# Patient Record
Sex: MALE | Race: WHITE | NOT HISPANIC OR LATINO | Employment: OTHER | URBAN - METROPOLITAN AREA
[De-identification: names, ages, dates, MRNs, and addresses within clinical notes are randomized per-mention and may not be internally consistent; named-entity substitution may affect disease eponyms.]

---

## 2018-12-19 ENCOUNTER — OFFICE VISIT (OUTPATIENT)
Dept: FAMILY MEDICINE | Facility: CLINIC | Age: 58
End: 2018-12-19
Payer: COMMERCIAL

## 2018-12-19 VITALS
SYSTOLIC BLOOD PRESSURE: 138 MMHG | HEIGHT: 72 IN | HEART RATE: 66 BPM | DIASTOLIC BLOOD PRESSURE: 70 MMHG | BODY MASS INDEX: 22.89 KG/M2 | TEMPERATURE: 96.9 F | WEIGHT: 169 LBS

## 2018-12-19 DIAGNOSIS — Z11.59 NEED FOR HEPATITIS C SCREENING TEST: ICD-10-CM

## 2018-12-19 DIAGNOSIS — K40.91 UNILATERAL RECURRENT INGUINAL HERNIA WITHOUT OBSTRUCTION OR GANGRENE: ICD-10-CM

## 2018-12-19 DIAGNOSIS — L82.1 SEBORRHEIC KERATOSIS: ICD-10-CM

## 2018-12-19 DIAGNOSIS — Z12.5 SCREENING FOR PROSTATE CANCER: ICD-10-CM

## 2018-12-19 DIAGNOSIS — Z11.4 SCREENING FOR HIV (HUMAN IMMUNODEFICIENCY VIRUS): ICD-10-CM

## 2018-12-19 DIAGNOSIS — Z13.6 SCREENING FOR HEART DISEASE: ICD-10-CM

## 2018-12-19 DIAGNOSIS — A63.0 ANAL CONDYLOMA: Primary | ICD-10-CM

## 2018-12-19 DIAGNOSIS — Z12.11 SCREEN FOR COLON CANCER: ICD-10-CM

## 2018-12-19 DIAGNOSIS — Z23 NEED FOR PROPHYLACTIC VACCINATION AND INOCULATION AGAINST INFLUENZA: ICD-10-CM

## 2018-12-19 PROCEDURE — 36415 COLL VENOUS BLD VENIPUNCTURE: CPT | Performed by: FAMILY MEDICINE

## 2018-12-19 PROCEDURE — 86803 HEPATITIS C AB TEST: CPT | Performed by: FAMILY MEDICINE

## 2018-12-19 PROCEDURE — 90471 IMMUNIZATION ADMIN: CPT | Performed by: FAMILY MEDICINE

## 2018-12-19 PROCEDURE — 99204 OFFICE O/P NEW MOD 45 MIN: CPT | Mod: 25 | Performed by: FAMILY MEDICINE

## 2018-12-19 PROCEDURE — 90686 IIV4 VACC NO PRSV 0.5 ML IM: CPT | Performed by: FAMILY MEDICINE

## 2018-12-19 PROCEDURE — 80061 LIPID PANEL: CPT | Performed by: FAMILY MEDICINE

## 2018-12-19 PROCEDURE — 87389 HIV-1 AG W/HIV-1&-2 AB AG IA: CPT | Performed by: FAMILY MEDICINE

## 2018-12-19 PROCEDURE — G0103 PSA SCREENING: HCPCS | Performed by: FAMILY MEDICINE

## 2018-12-19 RX ORDER — IMIQUIMOD 12.5 MG/.25G
CREAM TOPICAL
Qty: 24 PACKET | Refills: 1 | Status: SHIPPED | OUTPATIENT
Start: 2018-12-19 | End: 2021-11-23

## 2018-12-19 ASSESSMENT — MIFFLIN-ST. JEOR: SCORE: 1619.09

## 2018-12-19 NOTE — PATIENT INSTRUCTIONS
[facility]   Discharged by : cinthia  Paper scripts provided to patient : none     If you have any questions regarding your visit please contact your care team:     Team Gold                Clinic Hours Telephone Number     Dr. Yu Gatica, CNP  Elvia Arellano, CNP 7am-7pm  Monday - Thursday   7am-5pm  Fridays  [phone]   (Appointment scheduling available 24/7)     RN Line  [phone] option 2     Urgent Care - [place] and [place] - 11am-9pm Monday-Friday Saturday-Sunday- 9am-5pm     [place] -   5pm-9pm Monday-Friday Saturday-Sunday- 9am-5pm    [phone] - [place]    [phone] - [place]       For a Price Quote for your services, please call our Consumer Price Line at [phone].     What options do I have for visits at the clinic other than the traditional office visit?     To expand how we care for you, many of our providers are utilizing electronic visits (e-visits) and telephone visits, when medically appropriate, for interactions with their patients rather than a visit in the clinic. We also offer nurse visits for many medical concerns. Just like any other service, we will bill your insurance company for this type of visit based on time spent on the phone with your provider. Not all insurance companies cover these visits. Please check with your medical insurance if this type of visit is covered. You will be responsible for any charges that are not paid by your insurance.   E-visits via [employer]: generally incur a $35.00 fee.     Telephone visits:  Time spent on the phone: *charged based on time that is spent on the phone in increments of 10 minutes. Estimated cost:   5-10 mins $30.00   11-20 mins. $59.00   21-30 mins. $85.00       Use [employer] (secure email communication and access to your chart) to send your primary care provider a message or make an appointment. Ask someone on  your Team how to sign up for [employer].     As always, Thank you for trusting us with your health care needs!      [facility]:    Scheduling Appointments  [place] [place]  Call: [phone]    [facility]  Call: [phone]    [facility]  Call: [phone]    For Gastroenterology referrals   [facility]   Clinics and Surgery [place], 4th Floor   [address] [place] [address]   Appointments: [phone]    WHERE TO GO FOR CARE?  Clinic    Make an appointment if you:       Are sick (cold, cough, flu, sore throat, earache or in pain).       Have a small injury (sprain, small cut, burn or broken bone).       Need a physical exam, Pap smear, vaccine or prescription refill.       Have questions about your health or medicines.    To reach us:      Call [phone] ([phone]). Open 24 hours every day. (For counseling services, call [phone].)    Log into [employer] at [email or web address]. (Visit [email or web address] to create an account.) Hospital emergency room    An emergency is a serious or life- threatening problem that must be treated right away.    Call 643 or get to the hospital if you have:      Very bad or sudden:            - Chest pain or pressure         - Bleeding         - Head or belly pain         - Dizziness or trouble seeing, walking or                          Speaking      Problems breathing      Blood in your vomit or you are coughing up blood      A major injury (knocked out, loss of a finger or limb, rape, broken bone protruding from skin)    A mental health crisis. (Or call the Mental Health Crisis line at 1-655.132.4321 or Suicide Prevention Hotline at 1-307.269.7515.)    Open 24 hours every day. You don't need an appointment.     Urgent care    Visit urgent care for sickness or small injuries when the clinic is closed. You don't need an appointment. To check hours or find  an urgent care near you, visit [email or web address]. Online care    Get online care from [facility] for more than 70 common problems, like colds, allergies and infections. Open 24 hours every day at:   [email or web address]   Need help deciding?    For advice about where to be seen, you may call your clinic and ask to speak with a nurse. We're here for you 24 hours every day.         If you are deaf or hard of hearing, please let us know. We provide many free services including sign language interpreters, oral interpreters, TTYs, telephone amplifiers, note takers and written materials.

## 2018-12-19 NOTE — PROGRESS NOTES
SUBJECTIVE:   Red Olvera is a 58 year old male who presents to clinic today for the following health issues:      Other Concerns:  -Skin check - moles - one on the back of the neck seems to bother a bit more    -Possible inguinal hernia- has continued since 2013- from last visit- inguinal wasn't there but pain when working out, running and lifting.  Had noted a lump in the past but not much for a while.  No urinary symptoms.      Hemorrhoids  Onset: Intermittent x few years    Description:   Pain: no   Itching: YES    Accompanying Signs & Symptoms:  Blood streaked toilet paper: YES   Blood in stool: no   Changes in stool pattern: no     History:   Any previous GI studies done: Gallbladder surgery 8-10 years ago  Family History of colon cancer: no     Precipitating factors:   None    Alleviating factors:  None    Therapies Tried and outcome: none    Patient has intermittent bright red white type blood.  He looked back there was a mirror and thought he might of saw some whitish growths suggestive of warts.  He does have a history of genital warts previously treated.  There is no blood in the toilet.  He has no pain with defecation.  Blood is on the toilet paper only.        Problem list and histories reviewed & adjusted, as indicated.  Additional history: as documented    Patient Active Problem List   Diagnosis     CARDIOVASCULAR SCREENING; LDL GOAL LESS THAN 160     Past Surgical History:   Procedure Laterality Date     CHOLECYSTECTOMY, LAPOROSCOPIC       VASECTOMY         Social History     Tobacco Use     Smoking status: Never Smoker     Smokeless tobacco: Never Used   Substance Use Topics     Alcohol use: Yes     Comment: a beer a night     Family History   Problem Relation Age of Onset     Dementia Mother      Lipids Father      Hyperlipidemia Father      Diabetes Maternal Grandfather      Hypertension No family hx of      Cancer No family hx of      Prostate Cancer No family hx of      Cancer - colorectal No  "family hx of      C.A.D. No family hx of      Cerebrovascular Disease No family hx of          Current Outpatient Medications   Medication Sig Dispense Refill     imiquimod (ALDARA) 5 % external cream Apply topically three times a week 24 packet 1     No Known Allergies    Reviewed and updated as needed this visit by clinical staff  Tobacco  Allergies  Meds  Med Hx  Surg Hx  Fam Hx  Soc Hx      Reviewed and updated as needed this visit by Provider         ROS:  Constitutional, HEENT, cardiovascular, pulmonary, gi and gu systems are negative, except as otherwise noted.    OBJECTIVE:     /70 (BP Location: Right arm, Patient Position: Sitting, Cuff Size: Adult Large)   Pulse 66   Temp 96.9  F (36.1  C) (Tympanic)   Ht 1.82 m (5' 11.65\")   Wt 76.7 kg (169 lb)   BMI 23.14 kg/m    Body mass index is 23.14 kg/m .  GENERAL: healthy, alert and no distress  EYES: Eyes grossly normal to inspection, PERRL and conjunctivae and sclerae normal   (male): testicles normal without atrophy or masses, hernia mild bilateral inguinal hernias only with Valsalva.  And penis normal without urethral discharge  RECTAL (male): In the standing position he has a skin tag at 11:00 suggestive of a healed anal fissure.  From 3:00 through 9:00 there are rough whitish hyperkeratotic tissues consistent with anal condyloma and some evidence of irritation with bright red bleeding.  MS: no gross musculoskeletal defects noted, no edema  SKIN: no suspicious lesions or rashes  NEURO: Normal strength and tone, mentation intact and speech normal  PSYCH: mentation appears normal, affect normal/bright    Diagnostic Test Results:  none     ASSESSMENT/PLAN:             1. Anal condyloma  He does appear to have condyloma which is probably the source of the bleeding.  I suggested a trial of Aldara.  Follow-up would be recommended for further evaluation or referral if symptoms do not improve with this.  - imiquimod (ALDARA) 5 % external cream; " Apply topically three times a week  Dispense: 24 packet; Refill: 1    2. Unilateral recurrent inguinal hernia without obstruction or gangrene  He has a mild inguinal hernia on the right side that seems to be causing probably some mild symptomatology.  Indications for surgical consultation and modification of activity was briefly reviewed today.    3. Screen for colon cancer  Colon cancer screening options were reviewed.  Colonoscopy was declined.  Fit testing was offered but I would treat the condyloma before doing this.  - Fecal colorectal cancer screen (FIT); Future    4. Need for prophylactic vaccination and inoculation against influenza  Flu shot was provided today.  - FLU VACCINE, SPLIT VIRUS, IM (QUADRIVALENT) [96600]- >3 YRS  - Vaccine Administration, Initial [70484]    5. Need for hepatitis C screening test  One-time screening test for hepatitis C was discussed and performed today.  - Hepatitis C Screen Reflex to HCV RNA Quant and Genotype    6. Screening for HIV (human immunodeficiency virus)   Recommended one-time screening for HIV was discussed and performed today.  - HIV Screening    7. Seborrheic keratosis  The lesion on the back of the neck was consistent with a small seborrheic keratosis.  Whole body skin examination did not reveal any other findings concerning for malignancy.    8. Screening for prostate cancer  Screening for prostate cancer was discussed and PSA testing was done today.  - PSA, screen    9. Screening for heart disease  Lipid panel was performed.  Last checked approximately 5 years ago and a bit elevated.  - Lipid panel reflex to direct LDL Non-fasting    See Patient Instructions    Oscar Scott MD  [facility]    Injectable Influenza Immunization Documentation    1.  Is the person to be vaccinated sick today?   No    2. Does the person to be vaccinated have an allergy to a component   of the vaccine?   No  Egg Allergy Algorithm Link    3. Has the person  to be vaccinated ever had a serious reaction   to influenza vaccine in the past?   No    4. Has the person to be vaccinated ever had Guillain-Barré syndrome?   No    Form completed by patient

## 2018-12-19 NOTE — LETTER
[facility]  [address] [place] [id number] [phone]                                                                                                December 21, 2018    Red Olvera  [address]  [place] [address]        Dear Mr. Olvera,    Test results screening for prostate cancer, HIV, and hepatitis C were all normal.  The cholesterol profile is mildly elevated but better than the last time it was checked about 5 years ago.  Medication would not be recommended at this time.  Please let me know if the cream does not seem to clear up the bleeding.  Have a good holiday season.     Results for orders placed or performed in visit on 12/19/18   Hepatitis C Screen Reflex to HCV RNA Quant and Genotype   Result Value Ref Range    Hepatitis C Antibody Nonreactive NR^Nonreactive   HIV Screening   Result Value Ref Range    HIV Antigen Antibody Combo Nonreactive NR^Nonreactive       Lipid panel reflex to direct LDL Non-fasting   Result Value Ref Range    Cholesterol 231 (H) <200 mg/dL    Triglycerides 119 <150 mg/dL    HDL Cholesterol 62 >39 mg/dL    LDL Cholesterol Calculated 145 (H) <100 mg/dL    Non HDL Cholesterol 169 (H) <130 mg/dL   PSA, screen   Result Value Ref Range    PSA 1.55 0 - 4 ug/L       Sincerely,      Oscar Scott MD/aryan

## 2018-12-20 LAB
CHOLEST SERPL-MCNC: 231 MG/DL
HCV AB SERPL QL IA: NONREACTIVE
HDLC SERPL-MCNC: 62 MG/DL
HIV 1+2 AB+HIV1 P24 AG SERPL QL IA: NONREACTIVE
LDLC SERPL CALC-MCNC: 145 MG/DL
NONHDLC SERPL-MCNC: 169 MG/DL
PSA SERPL-ACNC: 1.55 UG/L (ref 0–4)
TRIGL SERPL-MCNC: 119 MG/DL

## 2019-01-10 ENCOUNTER — OFFICE VISIT (OUTPATIENT)
Dept: FAMILY MEDICINE | Facility: CLINIC | Age: 59
End: 2019-01-10
Payer: COMMERCIAL

## 2019-01-10 ENCOUNTER — ANCILLARY PROCEDURE (OUTPATIENT)
Dept: GENERAL RADIOLOGY | Facility: CLINIC | Age: 59
End: 2019-01-10
Payer: COMMERCIAL

## 2019-01-10 VITALS
BODY MASS INDEX: 22.98 KG/M2 | HEART RATE: 64 BPM | SYSTOLIC BLOOD PRESSURE: 124 MMHG | HEIGHT: 73 IN | DIASTOLIC BLOOD PRESSURE: 69 MMHG | TEMPERATURE: 97.9 F | WEIGHT: 173.4 LBS

## 2019-01-10 DIAGNOSIS — S49.92XA SHOULDER INJURY, LEFT, INITIAL ENCOUNTER: ICD-10-CM

## 2019-01-10 DIAGNOSIS — S49.92XA SHOULDER INJURY, LEFT, INITIAL ENCOUNTER: Primary | ICD-10-CM

## 2019-01-10 PROCEDURE — 73030 X-RAY EXAM OF SHOULDER: CPT | Mod: LT

## 2019-01-10 PROCEDURE — 99214 OFFICE O/P EST MOD 30 MIN: CPT | Performed by: PHYSICIAN ASSISTANT

## 2019-01-10 ASSESSMENT — PAIN SCALES - GENERAL: PAINLEVEL: MILD PAIN (2)

## 2019-01-10 ASSESSMENT — MIFFLIN-ST. JEOR: SCORE: 1652.8

## 2019-01-10 NOTE — PROGRESS NOTES
SUBJECTIVE:   Red Olvera is a 58 year old male who presents to clinic today for the following health issues:      Joint Pain    Onset: 1 day    Description:   Location: left shoulder  Character: Sharp    Intensity: 2/10 currently    Progression of Symptoms: worse    Accompanying Signs & Symptoms:  Other symptoms: swelling a little, sensitive to the touch, stiff and sore    History:   Previous similar pain: no       Precipitating factors:   Trauma or overuse: YES- fell on it    Alleviating factors:  Improved by: rest/inactivity    Therapies Tried and outcome: OTC Ibuprofen and Tylenol helped     [place]ell [place] cross [place] skin yesterday. He fell at the bottom of the hill and fell on the left shoulder. Has a lot of pain and it is hard to move the shoulder.   Was able to ski back to the car initially. Lots of pain to drive home. Took 600mg of ibuprofen and some tylenol and iced it to help with the pain. After a few hours it did improve some, where he had no pain while sitting still.   Managed to sleep off and on throughout the night. Did have a sling that he wore over night   As long as he doesn't move the upper part of the arm he is fine, a little bit of tingling in the left hand, that comes and goes.   Right hand dominant.       Problem list and histories reviewed & adjusted, as indicated.  Additional history: as documented    Patient Active Problem List   Diagnosis     CARDIOVASCULAR SCREENING; LDL GOAL LESS THAN 160     Past Surgical History:   Procedure Laterality Date     CHOLECYSTECTOMY, LAPOROSCOPIC       VASECTOMY         Social History     Tobacco Use     Smoking status: Never Smoker     Smokeless tobacco: Never Used   Substance Use Topics     Alcohol use: Yes     Comment: a beer a night     Family History   Problem Relation Age of Onset     Dementia Mother      Lipids Father      Hyperlipidemia Father      Diabetes Maternal Grandfather      Hypertension No family hx of      Cancer No family hx of       "Prostate Cancer No family hx of      Cancer - colorectal No family hx of      C.A.D. No family hx of      Cerebrovascular Disease No family hx of            Reviewed and updated as needed this visit by clinical staff  Tobacco  Allergies  Meds  Problems  Med Hx  Surg Hx  Fam Hx  Soc Hx        Reviewed and updated as needed this visit by Provider  Tobacco  Allergies  Meds  Problems  Med Hx  Surg Hx  Fam Hx         ROS:  Constitutional, HEENT, cardiovascular, pulmonary, gi and gu systems are negative, except as otherwise noted.    OBJECTIVE:     /69 (BP Location: Right arm, Patient Position: Chair, Cuff Size: Adult Regular)   Pulse 64   Temp 97.9  F (36.6  C) (Oral)   Ht 1.842 m (6' 0.52\")   Wt 78.7 kg (173 lb 6.4 oz)   BMI 23.18 kg/m    Body mass index is 23.18 kg/m .  GENERAL: healthy, alert and no distress  MS: no gross musculoskeletal defects noted, no edema- 4/5  strength on the left. Pain with palpation to the left AC joint, minimal swelling, no bruising. Patient with significantly reduced active ROM, refusing to abduct the left arm due to pain. Normal passive ROM. deep tendon reflexes intact.   SKIN: no suspicious lesions or rashes  NEURO: Normal strength and tone, mentation intact and speech normal    Diagnostic Test Results:  none     ASSESSMENT/PLAN:       ICD-10-CM    1. Shoulder injury, left, initial encounter S49.92XA XR Shoulder Left 2 Views     ORTHO  REFERRAL   Questions AC joint injury. WIll keep in sling for the next 1 week, ibuprofen as needed. Follow up with ortho.     FUTURE APPOINTMENTS:       - Follow-up for annual visit or as needed    Nimo Alba PA-C  [facility]  "

## 2019-01-10 NOTE — RESULT ENCOUNTER NOTE
Red,     The radiologist did not call the area we were looking at a separation. Please follow up with the orthopedist as planned. They should call you in the next day or 2. No change to our plan at this time.   Nimo Alba PA-C

## 2019-01-10 NOTE — PATIENT INSTRUCTIONS
Ibuprofen 600mg every 6 hours as needed.   Ice as needed.   Wear sling for the next 1 week.   They will call you from orthopedics to schedule.

## 2019-01-18 NOTE — TELEPHONE ENCOUNTER
RECORDS RECEIVED FROM: internal   DATE RECEIVED: 1/18/19   NOTES STATUS DETAILS   OFFICE NOTE from referring provider Internal    OFFICE NOTE from other specialist Internal    DISCHARGE SUMMARY from hospital N/A    DISCHARGE REPORT from the ER N/A    OPERATIVE REPORT N/A    MEDICATION LIST Internal    IMPLANT RECORD/STICKER N/A    LABS     CBC/DIFF Internal    CULTURES n/a    INJECTIONS DONE IN RADIOLOGY N/A    MRI N/A    CT SCAN N/A    XRAYS (IMAGES & REPORTS) Internal    TUMOR     PATHOLOGY  Slides & report N/A    Left shoulder injury/xray in system/referred by Dr Nimo Alba at [facility] sched per pt

## 2019-01-22 ENCOUNTER — PRE VISIT (OUTPATIENT)
Dept: ORTHOPEDICS | Facility: CLINIC | Age: 59
End: 2019-01-22

## 2019-01-22 ENCOUNTER — OFFICE VISIT (OUTPATIENT)
Dept: ORTHOPEDICS | Facility: CLINIC | Age: 59
End: 2019-01-22
Payer: COMMERCIAL

## 2019-01-22 VITALS
DIASTOLIC BLOOD PRESSURE: 69 MMHG | WEIGHT: 173 LBS | SYSTOLIC BLOOD PRESSURE: 124 MMHG | HEIGHT: 72 IN | BODY MASS INDEX: 23.43 KG/M2

## 2019-01-22 DIAGNOSIS — S43.102A SEPARATION OF LEFT ACROMIOCLAVICULAR JOINT, INITIAL ENCOUNTER: Primary | ICD-10-CM

## 2019-01-22 ASSESSMENT — MIFFLIN-ST. JEOR: SCORE: 1642.72

## 2019-01-22 NOTE — LETTER
1/22/2019      RE: Red Olvera  [address]  [place] [address]       Sports Medicine Clinic Visit    PCP: Tristan Nunez    Red Olvera is a 58 year old male who is seen  in consultation at the request of Dr. Alba presenting with left shoulder pain.    Injury: States that he was cross country skiing when he went down a hill, losing control and falling.     Location of Pain: left shoulder  Duration of Pain: 13 day(s)  Pain is better with: Ibuprofen  Pain is worse with: Shrugging, shoulder abduction  Additional Features:   Treatment so far consists of: Ice and Ibuprofen  Prior History of related problems:     /69   Ht 1.829 m (6')   Wt 78.5 kg (173 lb)   BMI 23.46 kg/m            PMH:  Past Medical History:   Diagnosis Date     No active medical problems        Active problem list:  Patient Active Problem List   Diagnosis     CARDIOVASCULAR SCREENING; LDL GOAL LESS THAN 160       FH:  Family History   Problem Relation Age of Onset     Dementia Mother      Lipids Father      Hyperlipidemia Father      Diabetes Maternal Grandfather      Hypertension No family hx of      Cancer No family hx of      Prostate Cancer No family hx of      Cancer - colorectal No family hx of      C.A.D. 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 No   Social History Narrative     Not on file       MEDS:  See EMR, reviewed  ALL:  See EMR, reviewed    REVIEW OF SYSTEMS:  CONSTITUTIONAL:NEGATIVE for fever, chills, change in weight  INTEGUMENTARY/SKIN: NEGATIVE for worrisome rashes, moles or lesions  EYES: NEGATIVE for vision changes or irritation  ENT/MOUTH: NEGATIVE for ear, mouth and throat problems  RESP:NEGATIVE for significant cough or SOB  BREAST: NEGATIVE for masses, tenderness or discharge  CV: NEGATIVE for chest pain, palpitations or peripheral edema  GI: NEGATIVE for nausea, abdominal pain, heartburn, or change in bowel habits  :NEGATIVE for frequency, dysuria, or hematuria  :NEGATIVE for frequency, dysuria, or hematuria  NEURO: NEGATIVE for weakness, dizziness or paresthesias  ENDOCRINE: NEGATIVE for temperature intolerance, skin/hair changes  HEME/ALLERGY/IMMUNE: NEGATIVE for bleeding problems  PSYCHIATRIC: NEGATIVE for changes in mood or affect      Subjective: This 58-year-old male fell onto an adducted left shoulder 2 weeks ago.  He has discomfort at the AC joint.  An x-ray from the urgent care showed no signs of fracture.  He has seen improvement since his last visit and discontinued the sling.    Objective the overlying skin is normal.  I do not see any significant AC joint deformity.  He points to the joint as the area of discomfort.  He is nontender at the SCJ joint, nontender along the course of the clavicle, nontender the coracoid process.  He is tender distally at the AC joint.  Bilateral shoulder strength at the deltoid, supraspinatus, infraspinatus and subscapularis is intact.  Axillary nerve function is intact.  He will forward flex 90 degrees and abduct 90 degrees before he is limited  by some discomfort.  No effusion at the joint.  Overlying skin is intact.  Sensation is normal distally.  Appropriate in conversation and affect.    Assessment: Grade 1 AC joint separation    Plan: We went over early range of motion exercises to avoid shoulder stiffness.  We went over options for appropriate athletic activity over the next 2 weeks.  He will look for continued improvement over the next 3-4 weeks.  If not improving he agrees to be seen in follow-up for reevaluation.  We discussed the progression of return to sport.  He had no further questions.    This note was created with the use of Dragon software and unintentional spelling or errors may have occurred.      Junior Vasquez MD

## 2019-01-22 NOTE — PROGRESS NOTES
Sports Medicine Clinic Visit    PCP: Tristan Nunez    Red Olvera is a 58 year old male who is seen  in consultation at the request of Dr. Alba presenting with left shoulder pain.    Injury: States that he was cross country skiing when he went down a hill, losing control and falling.     Location of Pain: left shoulder  Duration of Pain: 13 day(s)  Pain is better with: Ibuprofen  Pain is worse with: Shrugging, shoulder abduction  Additional Features:   Treatment so far consists of: Ice and Ibuprofen  Prior History of related problems:     /69   Ht 1.829 m (6')   Wt 78.5 kg (173 lb)   BMI 23.46 kg/m           PMH:  Past Medical History:   Diagnosis Date     No active medical problems        Active problem list:  Patient Active Problem List   Diagnosis     CARDIOVASCULAR SCREENING; LDL GOAL LESS THAN 160       FH:  Family History   Problem Relation Age of Onset     Dementia Mother      Lipids Father      Hyperlipidemia Father      Diabetes Maternal Grandfather      Hypertension No family hx of      Cancer No family hx of      Prostate Cancer No family hx of      Cancer - colorectal No family hx of      C.A.D. 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 No   Social History Narrative     Not on file       MEDS:  See EMR, reviewed  ALL:  See EMR, reviewed    REVIEW OF SYSTEMS:  CONSTITUTIONAL:NEGATIVE for fever, chills, change in weight  INTEGUMENTARY/SKIN: NEGATIVE for worrisome rashes, moles or lesions  EYES: NEGATIVE for vision changes or irritation  ENT/MOUTH: NEGATIVE for ear, mouth and throat problems  RESP:NEGATIVE for significant cough or SOB  BREAST: NEGATIVE for masses, tenderness or discharge  CV: NEGATIVE for chest pain, palpitations or peripheral edema  GI: NEGATIVE for nausea, abdominal pain, heartburn, or change in bowel habits  :NEGATIVE for frequency, dysuria, or hematuria  :NEGATIVE for frequency, dysuria, or hematuria  NEURO: NEGATIVE for weakness, dizziness or paresthesias  ENDOCRINE: NEGATIVE for temperature intolerance, skin/hair changes  HEME/ALLERGY/IMMUNE: NEGATIVE for bleeding problems  PSYCHIATRIC: NEGATIVE for changes in mood or affect      Subjective: This 58-year-old male fell onto an adducted left shoulder 2 weeks ago.  He has discomfort at the AC joint.  An x-ray from the urgent care showed no signs of fracture.  He has seen improvement since his last visit and discontinued the sling.    Objective the overlying skin is normal.  I do not see any significant AC joint deformity.  He points to the joint as the area of discomfort.  He is nontender at the SCJ joint, nontender along the course of the clavicle, nontender the coracoid process.  He is tender distally at the AC joint.  Bilateral shoulder strength at the deltoid, supraspinatus, infraspinatus and subscapularis is intact.  Axillary nerve function is intact.  He will forward flex 90 degrees and abduct 90 degrees before he is limited by some discomfort.  No effusion at the joint.  Overlying skin is intact.   Sensation is normal distally.  Appropriate in conversation and affect.    Assessment: Grade 1 AC joint separation    Plan: We went over early range of motion exercises to avoid shoulder stiffness.  We went over options for appropriate athletic activity over the next 2 weeks.  He will look for continued improvement over the next 3-4 weeks.  If not improving he agrees to be seen in follow-up for reevaluation.  We discussed the progression of return to sport.  He had no further questions.    This note was created with the use of Dragon software and unintentional spelling or errors may have occurred.

## 2019-02-03 PROCEDURE — 82274 ASSAY TEST FOR BLOOD FECAL: CPT | Performed by: FAMILY MEDICINE

## 2019-02-09 LAB — HEMOCCULT STL QL IA: NEGATIVE

## 2019-02-11 DIAGNOSIS — Z12.11 SCREEN FOR COLON CANCER: ICD-10-CM

## 2020-03-02 ENCOUNTER — HEALTH MAINTENANCE LETTER (OUTPATIENT)
Age: 60
End: 2020-03-02

## 2020-12-14 ENCOUNTER — HEALTH MAINTENANCE LETTER (OUTPATIENT)
Age: 60
End: 2020-12-14

## 2021-04-07 ENCOUNTER — NURSE TRIAGE (OUTPATIENT)
Dept: FAMILY MEDICINE | Facility: CLINIC | Age: 61
End: 2021-04-07

## 2021-04-07 NOTE — TELEPHONE ENCOUNTER
Pain began experiencing pain in his foot about a week and half ago. It was bothering him when he went out for run, then felt better when he took some time off of the foot. Two days ago, he went out for a run again and then when he stopped running, the had a sudden sharp pain in his foot that went up his leg. His foot swelled up a bit. He used ice, ibuprofen which helped a bit. Now can barely walk on it. It is slightly swollen still.  Today he rates no pain when sitting, when standing/walking it is 6-7/10.  No numbness or tingling.  He is looking for an appointment. Notified him of options for family practice, walk in orthopedic clinic, or walk in urgent care. He would like to try to schedule with family practice on Friday. Tomorrow does not work for him. Scheduled appt for 04/09.  Home care advise given.  Pt instructed to go to UC or ER for any severe pain or worsening symptoms.      Additional Information    Negative: Entire foot is cool or blue in comparison to other foot    Negative: Purple or black skin on foot or toe    Negative: Followed an ankle or foot injury    Negative: Ankle pain is the main symptom    Negative: Red area or streak and fever    Negative: Swollen foot and fever    Negative: Patient sounds very sick or weak to the triager    MODERATE pain (e.g., interferes with normal activities, limping) and present > 3 days    Negative: SEVERE pain (e.g., excruciating, unable to do any normal activities)    Negative: Looks like a boil, infected sore, deep ulcer, or other infected rash (spreading redness, pus)    Negative: Swollen foot (EXCEPTIONs: localized bump from bunions, calluses, insect bite, sting)    Negative: Numbness in one foot (i.e., loss of sensation)    Protocols used: FOOT PAIN-A-[place]    Linda Gibbs RN

## 2021-04-09 ENCOUNTER — OFFICE VISIT (OUTPATIENT)
Dept: FAMILY MEDICINE | Facility: CLINIC | Age: 61
End: 2021-04-09
Payer: COMMERCIAL

## 2021-04-09 ENCOUNTER — ANCILLARY PROCEDURE (OUTPATIENT)
Dept: GENERAL RADIOLOGY | Facility: CLINIC | Age: 61
End: 2021-04-09
Attending: FAMILY MEDICINE
Payer: COMMERCIAL

## 2021-04-09 VITALS
HEART RATE: 59 BPM | WEIGHT: 168 LBS | OXYGEN SATURATION: 96 % | DIASTOLIC BLOOD PRESSURE: 74 MMHG | HEIGHT: 72 IN | TEMPERATURE: 99.3 F | SYSTOLIC BLOOD PRESSURE: 120 MMHG | BODY MASS INDEX: 22.75 KG/M2

## 2021-04-09 DIAGNOSIS — M79.672 LEFT FOOT PAIN: Primary | ICD-10-CM

## 2021-04-09 PROCEDURE — 99214 OFFICE O/P EST MOD 30 MIN: CPT | Performed by: FAMILY MEDICINE

## 2021-04-09 PROCEDURE — 73630 X-RAY EXAM OF FOOT: CPT | Mod: LT | Performed by: RADIOLOGY

## 2021-04-09 ASSESSMENT — PAIN SCALES - GENERAL: PAINLEVEL: SEVERE PAIN (7)

## 2021-04-09 ASSESSMENT — MIFFLIN-ST. JEOR: SCORE: 1610.04

## 2021-04-09 NOTE — PROGRESS NOTES
Assessment & Plan     1. Left foot pain  - concern for fracture and obtained xray   - per my view xray showed no fracture; official read pending  - advised to wear aircast during the day, can take off at night   - if symptoms continue to persist then will refer to podiatry for further evaluation   - XR Foot Left G/E 3 Views  - Ankle/Foot Bracing Supplies Order for DME - ONLY FOR DME        Deqa Gage Ramsey MD  [facility]    Henok Moon is a 60 year old who presents for the following health issues     HPI     Musculoskeletal problem/pain  Concerned about a stress fracture  Around 1.5 weeks ago before a run he was noticing some foot pain before going on his run. He had more pain while running and then walked. He then stopped running and did 3.5 miles on Tuesday. It bothered him while running. During the final step, pain shot through his foot. The rest of the night he could barely walk. He has to limp a lot. He has pain in the 3-5 metatarsals. He still has some mild swelling. He has used ice and ibuprofen. Initially there was some warmth that has improved. No redness.   Accompanying signs and symptoms:   Fevers: no  Numbness/tingling/weakness: no      Review of Systems         Objective    There were no vitals taken for this visit.  There is no height or weight on file to calculate BMI.  Physical Exam   /74 (BP Location: Right arm, Patient Position: Chair, Cuff Size: Adult Regular)   Pulse 59   Temp 99.3  F (37.4  C) (Tympanic)   Ht 1.829 m (6')   Wt 76.2 kg (168 lb)   SpO2 96%   BMI 22.78 kg/m    GENERAL: healthy, alert and no distress  MS: + left metatarsals with tenderness and mild edema over 3rd metatarsal, normal ROM of foot  SKIN: no suspicious lesions or rashes    Xray - Reviewed and interpreted by me.  See above

## 2021-04-18 ENCOUNTER — HEALTH MAINTENANCE LETTER (OUTPATIENT)
Age: 61
End: 2021-04-18

## 2021-10-02 ENCOUNTER — HEALTH MAINTENANCE LETTER (OUTPATIENT)
Age: 61
End: 2021-10-02

## 2021-11-22 ENCOUNTER — NURSE TRIAGE (OUTPATIENT)
Dept: FAMILY MEDICINE | Facility: CLINIC | Age: 61
End: 2021-11-22
Payer: COMMERCIAL

## 2021-11-22 NOTE — TELEPHONE ENCOUNTER
Patient called the clinic.  He reports that on 11/13/21 to 11/19/21 (4-5 days) he was experiencing of low grade fever ranging 100-102, chills,easily fatigue with workouts (not able to complete his usual daily workouts), body aches, headaches (wakes up with sensitivity to left side fo face that resolves in minutes, patient is able to go back to sleep).  Dime to quarter size spots of redness and swelling to his shin, calf and thigh areas. Sensitivity is still there just decreased in intensity. Rates pain discomfort at 7 or 8/10 especially in the mornings.    Patient reports concerns about poor circulation and pain to bilateral lower extremities in the mornings only that resolves with moving around and ambulation.  He is experiencing more shortness of breath than normal with activities such as going up and down the stairs. His lower extremities feel warmer to touch than the rest of his body.   Patient denies any other symptoms or concerns at this time.    Patient would like to be evaluated and treated.  Patient was scheduled for an appointment 11/23/21.  Patient was advised to call the clinic [phone] or seek medical assistance at the nearest ER or , if the symptoms persist or worsen before scheduled appointment.    Patient verbalized understanding and has no further questions or concerns at this time.        Reason for Disposition    Patient wants to be seen    Numbness or tingling on both sides of body and is a new symptom lasting > 24 hours    Additional Information    Negative: Difficult to awaken or acting confused (e.g., disoriented, slurred speech)    Negative: New neurologic deficit that is present NOW, sudden onset of ANY of the following: * Weakness of the face, arm, or leg on one side of the body* Numbness of the face, arm, or leg on one side of the body* Loss of speech or garbled speech    Negative: Sounds like a life-threatening emergency to the triager    Negative: Confusion, disorientation, or  hallucinations is the main symptom    Negative: Dizziness is the main symptom    Negative: Followed a head injury within last 3 days    Negative: Headache (with neurologic deficit)    Negative: Unable to urinate (or only a few drops) and bladder feels very full    Negative: Loss of control of bowel or bladder (i.e., incontinence) of new onset    Negative: Back pain with numbness (loss of sensation) in groin or rectal area    Negative: Patient sounds very sick or weak to the triager    Negative: Neurologic deficit that was brief (now gone), ANY of the following: * Weakness of the face, arm, or leg on one side of the body * Numbness of the face, arm, or leg on one side of the body * Loss of speech or garbled speech    Negative: Neurologic deficit of gradual onset, ANY of the following: * Weakness of the face, arm, or leg on one side of the body * Numbness of the face, arm, or leg on one side of the body * Loss of speech or garbled speech    Negative: Atherton palsy suspected (i.e., weakness only one side of the face, developing over hours to days, no other symptoms)    Negative: Tingling (e.g., pins and needles) of the face, arm or leg on one side of the body, that is  present now (Exceptions: chronic/recurrent symptom lasting > 4 weeks or tingling from known cause, such as: bumped elbow, carpal tunnel syndrome, pinched nerve, frostbite)    Negative: Neck pain (with neurologic deficit)    Negative: Back pain (with neurologic deficit)    Negative: Loss of speech or garbled speech is a chronic symptom (recurrent or ongoing problem lasting > 4 weeks)    Negative: Weakness of arm or leg is a chronic symptom (recurrent or ongoing problem lasting > 4 weeks)    Negative: Numbness or tingling in one or both hands is a chronic symptom (recurrent or ongoing problem lasting > 4 weeks)    Negative: Numbness or tingling in one or both feet is a chronic symptom (recurrent or ongoing problem lasting > 4 weeks)    Protocols used:  NEUROLOGIC DEFICIT-A-[place]

## 2021-11-23 ENCOUNTER — OFFICE VISIT (OUTPATIENT)
Dept: FAMILY MEDICINE | Facility: CLINIC | Age: 61
End: 2021-11-23
Payer: COMMERCIAL

## 2021-11-23 ENCOUNTER — ANCILLARY PROCEDURE (OUTPATIENT)
Dept: GENERAL RADIOLOGY | Facility: CLINIC | Age: 61
End: 2021-11-23
Attending: PHYSICIAN ASSISTANT
Payer: COMMERCIAL

## 2021-11-23 VITALS
WEIGHT: 172 LBS | HEART RATE: 63 BPM | HEIGHT: 72 IN | DIASTOLIC BLOOD PRESSURE: 69 MMHG | TEMPERATURE: 98 F | BODY MASS INDEX: 23.3 KG/M2 | SYSTOLIC BLOOD PRESSURE: 105 MMHG | OXYGEN SATURATION: 98 %

## 2021-11-23 DIAGNOSIS — L52 ERYTHEMA NODOSUM: Primary | ICD-10-CM

## 2021-11-23 DIAGNOSIS — L52 ERYTHEMA NODOSUM: ICD-10-CM

## 2021-11-23 LAB
BASOPHILS # BLD AUTO: 0 10E3/UL (ref 0–0.2)
BASOPHILS NFR BLD AUTO: 0 %
CRP SERPL-MCNC: 32.4 MG/L (ref 0–8)
EOSINOPHIL # BLD AUTO: 0.1 10E3/UL (ref 0–0.7)
EOSINOPHIL NFR BLD AUTO: 2 %
ERYTHROCYTE [DISTWIDTH] IN BLOOD BY AUTOMATED COUNT: 13 % (ref 10–15)
HCT VFR BLD AUTO: 40.2 % (ref 40–53)
HGB BLD-MCNC: 13.2 G/DL (ref 13.3–17.7)
IMM GRANULOCYTES # BLD: 0.2 10E3/UL
IMM GRANULOCYTES NFR BLD: 2 %
LYMPHOCYTES # BLD AUTO: 1.2 10E3/UL (ref 0.8–5.3)
LYMPHOCYTES NFR BLD AUTO: 14 %
MCH RBC QN AUTO: 30.8 PG (ref 26.5–33)
MCHC RBC AUTO-ENTMCNC: 32.8 G/DL (ref 31.5–36.5)
MCV RBC AUTO: 94 FL (ref 78–100)
MONOCYTES # BLD AUTO: 1 10E3/UL (ref 0–1.3)
MONOCYTES NFR BLD AUTO: 12 %
NEUTROPHILS # BLD AUTO: 6.1 10E3/UL (ref 1.6–8.3)
NEUTROPHILS NFR BLD AUTO: 70 %
NRBC # BLD AUTO: 0 10E3/UL
NRBC BLD AUTO-RTO: 0 /100
PLATELET # BLD AUTO: 425 10E3/UL (ref 150–450)
RBC # BLD AUTO: 4.28 10E6/UL (ref 4.4–5.9)
WBC # BLD AUTO: 8.6 10E3/UL (ref 4–11)

## 2021-11-23 PROCEDURE — 85025 COMPLETE CBC W/AUTO DIFF WBC: CPT | Performed by: PHYSICIAN ASSISTANT

## 2021-11-23 PROCEDURE — 99000 SPECIMEN HANDLING OFFICE-LAB: CPT | Performed by: PHYSICIAN ASSISTANT

## 2021-11-23 PROCEDURE — 36415 COLL VENOUS BLD VENIPUNCTURE: CPT | Performed by: PHYSICIAN ASSISTANT

## 2021-11-23 PROCEDURE — 99213 OFFICE O/P EST LOW 20 MIN: CPT | Performed by: PHYSICIAN ASSISTANT

## 2021-11-23 PROCEDURE — 86060 ANTISTREPTOLYSIN O TITER: CPT | Mod: 90 | Performed by: PHYSICIAN ASSISTANT

## 2021-11-23 PROCEDURE — 86140 C-REACTIVE PROTEIN: CPT | Performed by: PHYSICIAN ASSISTANT

## 2021-11-23 PROCEDURE — 71046 X-RAY EXAM CHEST 2 VIEWS: CPT | Performed by: RADIOLOGY

## 2021-11-23 RX ORDER — NAPROXEN 500 MG/1
500 TABLET ORAL 2 TIMES DAILY WITH MEALS
Qty: 30 TABLET | Refills: 1 | Status: SHIPPED | OUTPATIENT
Start: 2021-11-23

## 2021-11-23 ASSESSMENT — MIFFLIN-ST. JEOR: SCORE: 1623.19

## 2021-11-23 NOTE — PROGRESS NOTES
Assessment & Plan     Erythema nodosum  - CBC with platelets and differential; Future  - Streptolysin O Antibody (ASO); Future  - XR Chest 2 Views; Future  - CRP, inflammation; Future  - naproxen (NAPROSYN) 500 MG tablet; Take 1 tablet (500 mg) by mouth 2 times daily (with meals)  - CRP, inflammation  - Streptolysin O Antibody (ASO)  - CBC with platelets and differential  - ROM after periods of inactivity.         Return in about 2 months (around 1/23/2022) for follow up if symptoms persist, change or worsen.    Adrianne Zamudio PA-C  [facility] DAVID Moon is a 61 year old who presents for the following health issues  accompanied by his self.    HPI     Patient presents with:  Circulation Problems: poor circulation and pain to bilateral lower extremitie on different spots. there was dime size spots red and swelling, did have a lower grade fever with chills and headaches but it was gone in 5 days, very fatigue and SOB, Test Negative for COVID about 1 week ago      PMHx sig for eczema.  No other sx noted.     Review of Systems   Constitutional, HEENT, cardiovascular, pulmonary, gi and gu systems are negative, except as otherwise noted.      Objective    /69   Pulse 63   Temp 98  F (36.7  C) (Oral)   Ht 1.829 m (6')   Wt 78 kg (172 lb)   SpO2 98%   BMI 23.33 kg/m    Body mass index is 23.33 kg/m .  Physical Exam   GENERAL: healthy, alert and no distress  MS: no gross musculoskeletal defects noted, no edema  SKIN: tender erythematous nodules on both shins 1-2 cm.  Some coalesced.

## 2021-11-24 LAB — ASO AB SERPL-ACNC: 339 IU/ML

## 2021-12-14 ASSESSMENT — ENCOUNTER SYMPTOMS
WEAKNESS: 0
EYE PAIN: 0
SHORTNESS OF BREATH: 0
NERVOUS/ANXIOUS: 0
CHILLS: 0
ABDOMINAL PAIN: 0
CONSTIPATION: 0
PALPITATIONS: 0
PARESTHESIAS: 0
JOINT SWELLING: 0
DIZZINESS: 0
NAUSEA: 0
FREQUENCY: 0
FEVER: 0
SORE THROAT: 0
HEMATURIA: 0
MYALGIAS: 0
DYSURIA: 0
HEADACHES: 0
HEMATOCHEZIA: 0
ARTHRALGIAS: 0
HEARTBURN: 0
COUGH: 0
DIARRHEA: 0

## 2021-12-21 ENCOUNTER — OFFICE VISIT (OUTPATIENT)
Dept: FAMILY MEDICINE | Facility: CLINIC | Age: 61
End: 2021-12-21
Payer: COMMERCIAL

## 2021-12-21 VITALS
SYSTOLIC BLOOD PRESSURE: 113 MMHG | HEART RATE: 55 BPM | OXYGEN SATURATION: 99 % | BODY MASS INDEX: 23.81 KG/M2 | WEIGHT: 175.8 LBS | HEIGHT: 72 IN | TEMPERATURE: 98.2 F | DIASTOLIC BLOOD PRESSURE: 68 MMHG

## 2021-12-21 DIAGNOSIS — L52 ERYTHEMA NODOSUM: ICD-10-CM

## 2021-12-21 DIAGNOSIS — E78.5 HYPERLIPIDEMIA LDL GOAL <100: ICD-10-CM

## 2021-12-21 DIAGNOSIS — L29.0 ANAL PRURITUS: ICD-10-CM

## 2021-12-21 DIAGNOSIS — Z00.00 ROUTINE GENERAL MEDICAL EXAMINATION AT A HEALTH CARE FACILITY: Primary | ICD-10-CM

## 2021-12-21 DIAGNOSIS — L28.0 NEURODERMATITIS, LOCALIZED: ICD-10-CM

## 2021-12-21 PROBLEM — A63.0 ANAL WARTS: Status: ACTIVE | Noted: 2021-12-21

## 2021-12-21 LAB
ANION GAP SERPL CALCULATED.3IONS-SCNC: 5 MMOL/L (ref 3–14)
BUN SERPL-MCNC: 14 MG/DL (ref 7–30)
CALCIUM SERPL-MCNC: 9.7 MG/DL (ref 8.5–10.1)
CHLORIDE BLD-SCNC: 102 MMOL/L (ref 94–109)
CHOLEST SERPL-MCNC: 298 MG/DL
CO2 SERPL-SCNC: 29 MMOL/L (ref 20–32)
CREAT SERPL-MCNC: 0.83 MG/DL (ref 0.66–1.25)
FASTING STATUS PATIENT QL REPORTED: NO
GFR SERPL CREATININE-BSD FRML MDRD: >90 ML/MIN/1.73M2
GLUCOSE BLD-MCNC: 97 MG/DL (ref 70–99)
HDLC SERPL-MCNC: 68 MG/DL
LDLC SERPL CALC-MCNC: 190 MG/DL
NONHDLC SERPL-MCNC: 230 MG/DL
POTASSIUM BLD-SCNC: 5.2 MMOL/L (ref 3.4–5.3)
SODIUM SERPL-SCNC: 136 MMOL/L (ref 133–144)
TRIGL SERPL-MCNC: 202 MG/DL

## 2021-12-21 PROCEDURE — 90471 IMMUNIZATION ADMIN: CPT | Performed by: PHYSICIAN ASSISTANT

## 2021-12-21 PROCEDURE — 99000 SPECIMEN HANDLING OFFICE-LAB: CPT | Performed by: PHYSICIAN ASSISTANT

## 2021-12-21 PROCEDURE — 80048 BASIC METABOLIC PNL TOTAL CA: CPT | Performed by: PHYSICIAN ASSISTANT

## 2021-12-21 PROCEDURE — 36415 COLL VENOUS BLD VENIPUNCTURE: CPT | Performed by: PHYSICIAN ASSISTANT

## 2021-12-21 PROCEDURE — 80061 LIPID PANEL: CPT | Performed by: PHYSICIAN ASSISTANT

## 2021-12-21 PROCEDURE — 99396 PREV VISIT EST AGE 40-64: CPT | Mod: 25 | Performed by: PHYSICIAN ASSISTANT

## 2021-12-21 PROCEDURE — 90682 RIV4 VACC RECOMBINANT DNA IM: CPT | Performed by: PHYSICIAN ASSISTANT

## 2021-12-21 PROCEDURE — 86060 ANTISTREPTOLYSIN O TITER: CPT | Mod: 90 | Performed by: PHYSICIAN ASSISTANT

## 2021-12-21 RX ORDER — HYDROXYZINE HYDROCHLORIDE 25 MG/1
25 TABLET, FILM COATED ORAL EVERY 8 HOURS PRN
Qty: 30 TABLET | Refills: 3 | Status: SHIPPED | OUTPATIENT
Start: 2021-12-21

## 2021-12-21 ASSESSMENT — ENCOUNTER SYMPTOMS
HEMATOCHEZIA: 0
SHORTNESS OF BREATH: 0
DYSURIA: 0
NERVOUS/ANXIOUS: 0
PARESTHESIAS: 0
CONSTIPATION: 0
FEVER: 0
ARTHRALGIAS: 0
MYALGIAS: 0
COUGH: 0
CHILLS: 0
WEAKNESS: 0
DIARRHEA: 0
FREQUENCY: 0
DIZZINESS: 0
HEADACHES: 0
PALPITATIONS: 0
EYE PAIN: 0
SORE THROAT: 0
NAUSEA: 0
HEARTBURN: 0
HEMATURIA: 0
JOINT SWELLING: 0
ABDOMINAL PAIN: 0

## 2021-12-21 ASSESSMENT — MIFFLIN-ST. JEOR: SCORE: 1633.67

## 2021-12-21 NOTE — PATIENT INSTRUCTIONS
Preventive Health Recommendations  Male Ages 50 - 64    Yearly exam:             See your health care provider every year in order to  o   Review health changes.   o   Discuss preventive care.    o   Review your medicines if your doctor has prescribed any.     Have a cholesterol test every 5 years, or more frequently if you are at risk for high cholesterol/heart disease.     Have a diabetes test (fasting glucose) every three years. If you are at risk for diabetes, you should have this test more often.     Have a colonoscopy at age 50, or have a yearly FIT test (stool test). These exams will check for colon cancer.      Talk with your health care provider about whether or not a prostate cancer screening test (PSA) is right for you.    You should be tested each year for STDs (sexually transmitted diseases), if you re at risk.     Shots: Get a flu shot each year. Get a tetanus shot every 10 years.     Nutrition:    Eat at least 5 servings of fruits and vegetables daily.     Eat whole-grain bread, whole-wheat pasta and brown rice instead of white grains and rice.     Get adequate Calcium and Vitamin D.     Lifestyle    Exercise for at least 150 minutes a week (30 minutes a day, 5 days a week). This will help you control your weight and prevent disease.     Limit alcohol to one drink per day.     No smoking.     Wear sunscreen to prevent skin cancer.     See your dentist every six months for an exam and cleaning.     See your eye doctor every 1 to 2 years.    Patient Education   Gentle Skin Care  For Babies and Children  Gentle skin care starts with good bathing and keeping the skin moist. Gentle skin care helps babies and children with sensitive skin and eczema. It also helps with long-lasting (chronic) dry skin.  Skin care products  Here are some gentle skin care products you may want to try.* You can try other brands too. Generic and store brands are OK as well. Just make sure everything is fragrance free.  Mild  "cleansers (instead of soap):    Aquaphor 2 in1 Gentle Wash and Shampoo    CeraVe    Cetaphil Gentle Cleanser (Stay away from Cetaphil's \"baby\" line because it has fragrance.)    Dove Fragrance Free Bar    Vanicream Cleansing Bar  Shampoos and conditioners:    Aquaphor 2 in 1 Gentle Wash and Shampoo    [place] Baby \"Super Sensitive\" Shampoo    Free and Clear by Vanicream  Moisturizers:    Creams: Cetaphil cream, CeraVe cream, Eucerin cream, and Vanicream    Ointments: Aquaphor Ointment, Vaseline, petroleum jelly, and Vaniply  Don't use lotion: It's too thin for eczema. It can also have alcohol, which irritates the skin. Ointments and creams work better.  Oils:    Mineral oil    Coconut and sunflower seed oil work for some children.  Sunblock:     Use sunscreens that have zinc oxide or titanium dioxide. These block the sun.    Make sure the sunblock has SPF 30 or more.    Don't use spray cans (aerosols) or \"chemical\" sunscreens if you can avoid them.  Laundry products:    All Free and Clear    Cheer Free    Dreft    Tide Free    Generic Brands are OK as long as they are \"Fragrance Free.\"    Don't use fabric softeners or dryer sheets.  Stay away from these products    Don't use products that have added fragrance.    \"Organic\" does not mean \"fragrance free.\" In fact, some organic products have plant parts that can irritate sensitive skin.    Many \"baby\" products have added fragrance that may bother your child's skin.  Skin care tips  1. Daily bathing in a tub bath is best to soak the skin and get clean.  2. Use lukewarm water.  3. Keep bathing and showering short--less than 15 minutes.  4. When you wash, focus on the skin folds, face and feet.  5. After bathing, pat the skin lightly with a towel. Don't rub or scrub when drying.  6. Put on moisturizer right away after the bath.  7. If the doctor prescribed medicine to put on the skin, put the medicine on first. Then put on the moisturizer.  8. Use moisturizing creams " "at least 2 times a day on the whole body. For example, in the morning and before bed. Your provider may suggest using a lighter or heavier cream based on your child's skin and the time of year.  \"Do's\" and \"Don'ts\"  Do    Bathe in a tub rather than shower whenever you can.    Wash new clothes before your child wears them for the first time.    Put on moisturizing creams or ointments at least twice daily to the whole body.  Don't    Don't use bubble bath.    Don't scrub hard when cleaning the skin.    Don't use skin lotion instead of cream. Lotions don't work as well.    Don't use products like powders, perfumes or colognes.    Don't dress your child in \"scratchy\" clothes, like wool.  *We don't endorse any specific product or brand. The products listed here are just examples.  Prepared by the [facility] of Pediatric Dermatology. For informational purposes only. Not to replace the advice of your health care provider. Copyright   2017 [facility]. All rights reserved. [employer] 955768 - 4/17.       "

## 2021-12-21 NOTE — PROGRESS NOTES
SUBJECTIVE:   CC: Red Olvera is an 61 year old male who presents for preventative health visit.       Patient has been advised of split billing requirements and indicates understanding: Yes  Healthy Habits:     Getting at least 3 servings of Calcium per day:  Yes    Bi-annual eye exam:  NO    Dental care twice a year:  Yes    Sleep apnea or symptoms of sleep apnea:  Daytime drowsiness    Diet:  Regular (no restrictions)    Frequency of exercise:  4-5 days/week    Duration of exercise:  30-45 minutes    Taking medications regularly:  Yes    Barriers to taking medications:  None    Medication side effects:  None    PHQ-2 Total Score: 0    Additional concerns today:  No          -------------------------------------    Today's PHQ-2 Score:   PHQ-2 ( 1999 [employer]) 12/14/2021   Q1: Little interest or pleasure in doing things 0   Q2: Feeling down, depressed or hopeless 0   PHQ-2 Score 0   PHQ-2 Total Score (12-17 Years)- Positive if 3 or more points; Administer PHQ-A if positive -   Q1: Little interest or pleasure in doing things Not at all   Q2: Feeling down, depressed or hopeless Not at all   PHQ-2 Score 0       Abuse: Current or Past(Physical, Sexual or Emotional)- No  Do you feel safe in your environment? Yes    Have you ever done Advance Care Planning? (For example, a Health Directive, POLST, or a discussion with a medical provider or your loved ones about your wishes): Yes, patient states has an Advance Care Planning document and will bring a copy to the clinic.    Social History     Tobacco Use     Smoking status: Never Smoker     Smokeless tobacco: Never Used   Substance Use Topics     Alcohol use: Yes     Comment: a beer a night         Alcohol Use 12/14/2021   Prescreen: >3 drinks/day or >7 drinks/week? Yes   Prescreen: >3 drinks/day or >7 drinks/week? -   AUDIT SCORE  4     AUDIT - Alcohol Use Disorders Identification Test - Reproduced from the World Health Organization Audit 2001 (Second Edition) 12/14/2021    1.  How often do you have a drink containing alcohol? 4 or more times a week   2.  How many drinks containing alcohol do you have on a typical day when you are drinking? 1 or 2   3.  How often do you have five or more drinks on one occasion? Never   4.  How often during the last year have you found that you were not able to stop drinking once you had started? Never   5.  How often during the last year have you failed to do what was normally expected of you because of drinking? Never   6.  How often during the last year have you needed a first drink in the morning to get yourself going after a heavy drinking session? Never   7.  How often during the last year have you had a feeling of guilt or remorse after drinking? Never   8.  How often during the last year have you been unable to remember what happened the night before because of your drinking? Never   9.  Have you or someone else been injured because of your drinking? No   10. Has a relative, friend, doctor or other health care worker been concerned about your drinking or suggested you cut down? No   TOTAL SCORE 4       Last PSA:   PSA   Date Value Ref Range Status   12/19/2018 1.55 0 - 4 ug/L Final     Comment:     Assay Method:  Chemiluminescence using Siemens Vista analyzer       Reviewed orders with patient. Reviewed health maintenance and updated orders accordingly - Yes  Lab work is in process    Reviewed and updated as needed this visit by clinical staff  Tobacco  Allergies  Meds   Med Hx  Surg Hx  Fam Hx  Soc Hx       Reviewed and updated as needed this visit by Provider               Patient c/o anal itching for > 10 years.  Was treated for anal warts at one point and this was ineffective.     Review of Systems   Constitutional: Negative for chills and fever.   HENT: Negative for congestion, ear pain, hearing loss and sore throat.    Eyes: Negative for pain and visual disturbance.   Respiratory: Negative for cough and shortness of breath.   "  Cardiovascular: Negative for chest pain, palpitations and peripheral edema.   Gastrointestinal: Negative for abdominal pain, constipation, diarrhea, heartburn, hematochezia and nausea.   Genitourinary: Positive for impotence. Negative for dysuria, frequency, genital sores, hematuria and urgency.   Musculoskeletal: Negative for arthralgias, joint swelling and myalgias.   Skin: Negative for rash.   Neurological: Negative for dizziness, weakness, headaches and paresthesias.   Psychiatric/Behavioral: Negative for mood changes. The patient is not nervous/anxious.      Patient c/o anal itching x 10 years.     OBJECTIVE:   /68   Pulse 55   Temp 98.2  F (36.8  C) (Oral)   Ht 1.818 m (5' 11.58\")   Wt 79.7 kg (175 lb 12.8 oz)   SpO2 99%   BMI 24.13 kg/m      Physical Exam  GENERAL: healthy, alert and no distress  EYES: Eyes grossly normal to inspection, PERRL and conjunctivae and sclerae normal  HENT: ear canals and TM's normal, nose and mouth without ulcers or lesions  NECK: no adenopathy, no asymmetry, masses, or scars and thyroid normal to palpation  RESP: lungs clear to auscultation - no rales, rhonchi or wheezes  CV: regular rate and rhythm, normal S1 S2, no S3 or S4, no murmur, click or rub, no peripheral edema and peripheral pulses strong  ABDOMEN: soft, nontender, no hepatosplenomegaly, no masses and bowel sounds normal  RECTAL: sphincter tone Normal, no masses, prostate of normal size for age, smooth, nontender without masses/nodules and thickened keratotic fissured mucosa from chronic excoriation noted.   MS: no gross musculoskeletal defects noted, no edema  SKIN: no suspicious lesions or rashes  NEURO: Normal strength and tone, mentation intact and speech normal  PSYCH: mentation appears normal, affect normal/bright    Diagnostic Test Results:  Labs reviewed in Epic    ASSESSMENT/PLAN:       ICD-10-CM    1. Routine general medical examination at a health care facility  Z00.00 INFLUENZA QUAD, " "RECOMBINANT, P-FREE (RIV4) (FLUBLOK)     Adult Gastro Ref - Procedure Only     Basic metabolic panel  (Ca, Cl, CO2, Creat, Gluc, K, Na, BUN)     Lipid panel reflex to direct LDL Non-fasting   2. Hyperlipidemia LDL goal <100  E78.5    3. Anal pruritus  L29.0 Ova and Parasite Exam Routine     hydrOXYzine (ATARAX) 25 MG tablet   4. Neurodermatitis, localized  L28.0 hydrOXYzine (ATARAX) 25 MG tablet       Patient has been advised of split billing requirements and indicates understanding: Yes  COUNSELING:   Reviewed preventive health counseling, as reflected in patient instructions    Estimated body mass index is 24.13 kg/m  as calculated from the following:    Height as of this encounter: 1.818 m (5' 11.58\").    Weight as of this encounter: 79.7 kg (175 lb 12.8 oz).         He reports that he has never smoked. He has never used smokeless tobacco.      Counseling Resources:  ATP IV Guidelines  Pooled Cohorts Equation Calculator  FRAX Risk Assessment  ICSI Preventive Guidelines  Dietary Guidelines for Americans, 2010  USDA's MyPlate  ASA Prophylaxis  Lung CA Screening    [facility]  "

## 2021-12-22 LAB — ASO AB SERPL-ACNC: 350 IU/ML

## 2021-12-28 ENCOUNTER — VIRTUAL VISIT (OUTPATIENT)
Dept: FAMILY MEDICINE | Facility: CLINIC | Age: 61
End: 2021-12-28
Payer: COMMERCIAL

## 2021-12-28 DIAGNOSIS — E78.5 HYPERLIPIDEMIA LDL GOAL <130: ICD-10-CM

## 2021-12-28 DIAGNOSIS — L52 ERYTHEMA NODOSUM: Primary | ICD-10-CM

## 2021-12-28 DIAGNOSIS — R76.0 ANTI-STREPTOLYSIN TITER ABNORMAL: ICD-10-CM

## 2021-12-28 PROCEDURE — 99214 OFFICE O/P EST MOD 30 MIN: CPT | Mod: 95 | Performed by: PHYSICIAN ASSISTANT

## 2021-12-28 RX ORDER — AMOXICILLIN 875 MG
875 TABLET ORAL 2 TIMES DAILY
Qty: 20 TABLET | Refills: 0 | Status: SHIPPED | OUTPATIENT
Start: 2021-12-28 | End: 2022-01-07

## 2021-12-28 NOTE — PATIENT INSTRUCTIONS
"  Patient Education     Controlling Your Cholesterol  Cholesterol is a waxy substance. It travels in your blood through the blood vessels. When you have high cholesterol, it can build up along the walls of the blood vessels. This makes the vessels narrower and decreases blood flow. You are then at greater risk of having a heart attack or a stroke.  Good and bad cholesterol  Lipids are fats, and blood is mostly water. Fat and water don't mix. So our bodies need lipoproteins (lipids inside a protein shell) to carry the lipids. The protein shell carries its lipids through the bloodstream. There are two main kinds of lipoproteins:    LDL (low-density lipoprotein) is known as \"bad cholesterol.\" It mainly carries cholesterol. It delivers this cholesterol to body cells. Excess LDL cholesterol will build up in artery walls. This increases your risk for heart disease and stroke.    HDL (high-density lipoprotein) is known as \"good cholesterol.\" This protein shell collects excess cholesterol that LDLs have left behind on blood vessel walls. That's why high levels of HDL cholesterol can decrease your risk of heart disease and stroke.  Controlling cholesterol levels  Total cholesterol includes LDL and HDL cholesterol, as well as other fats in the bloodstream. If your total cholesterol is high, follow the steps below to help lower your total cholesterol level:  Eat less unhealthy fat    Cut back on saturated fats and trans fats (also called hydrogenated) by selecting lean cuts of meat, low-fat dairy, and using oils instead of solid fats. Limit baked goods, processed meats, and fried foods. A diet that s high in these fats increases your bad cholesterol. It's not enough to just cut back on foods containing cholesterol.    Eat about two, 3.5 ounce servings of non-fried fish such as salmon, herring, sardines or mackerel per week . Most fish contain omega-3 fatty acids. These help lower total blood cholesterol. Omega-3 fatty acids " lowers triglyceride levels, another form of fat in the blood. If you are pregnant or thinking of becoming pregnant or are breastfeeding, talk with your healthcare provider for advice about the best fish choices and how much to eat.    Eat more whole grains and soluble fiber (such as oat bran). These lower overall cholesterol.  Be active    Choose an activity you enjoy. Walking, swimming, and riding a bike are some good ways to be active.    Start at a level where you feel comfortable. Increase your time and pace a little each week.    Work up to 30 to 40 minutes of moderate to high intensity physical activity at least 3 to 4 days per week.    Remember, some activity is better than none.    If you haven't been exercising regularly, start slowly. Check with your healthcare provider to make sure the exercise plan is right for you.  Quit smoking  Quitting smoking can improve your lipid levels. It also lowers your risk for heart disease and stroke.  Manage your weight  If you are overweight or obese, your healthcare provider will work with you to lose weight and lower your BMI (body mass index) to a normal or near-normal level. Making diet changes and increasing physical activity can help.  Take medicine as directed  Many people need medicine to get their LDL levels to a safe level. Medicine to lower cholesterol levels is effective and safe. Taking medicine is not a substitute for exercise or watching your diet! Your healthcare provider can tell you whether you might benefit from a cholesterol-lowering medicine.  Darryl last reviewed this educational content on 6/1/2019 2000-2021 [employer]. All rights reserved. This information is not intended as a substitute for professional medical care. Always follow your healthcare professional's instructions.

## 2021-12-28 NOTE — PROGRESS NOTES
Red is a 61 year old who is being evaluated via a billable video visit.      How would you like to obtain your AVS? MyChart  If the video visit is dropped, the invitation should be resent by: Text to cell phone: [phone]  Will anyone else be joining your video visit? No    Video Start Time: 10:23 AM    Assessment & Plan     Erythema nodosum  - amoxicillin (AMOXIL) 875 MG tablet; Take 1 tablet (875 mg) by mouth 2 times daily for 10 days    Anti-streptolysin titer abnormal  - amoxicillin (AMOXIL) 875 MG tablet; Take 1 tablet (875 mg) by mouth 2 times daily for 10 days    Hyperlipidemia LDL goal <130        Return in about 2 weeks (around 1/11/2022) for for labs.    Adrianne Zamudio PA-C  [facility]    Henok Moon is a 61 year old who presents for the following health issues  accompanied by his self.    HPI     Patient presents with:  Results: Lab Results 12/21/2021      Reviewed labs.  Patient aware of increasing anti-streptolysin O antibodies and is amenable to strep treatment.  Let him know I will look in to other conditions that may cause false positive but for now treatment recommended.  Hyperlipidemia reviewed.  Patient will consult with new clinician for follow up as he has to change clinics at the beginning of the year due to insurance reasons.      Review of Systems   Constitutional, HEENT, cardiovascular, pulmonary, gi and gu systems are negative, except as otherwise noted.      Objective           Vitals:  No vitals were obtained today due to virtual visit.    Physical Exam   GENERAL: Healthy, alert and no distress  EYES: Eyes grossly normal to inspection.  No discharge or erythema, or obvious scleral/conjunctival abnormalities.  RESP: No audible wheeze, cough, or visible cyanosis.  No visible retractions or increased work of breathing.    SKIN: Visible skin clear. No significant rash, abnormal pigmentation or lesions.  NEURO: Cranial nerves grossly intact.  Mentation  and speech appropriate for age.  PSYCH: Mentation appears normal, affect normal/bright, judgement and insight intact, normal speech and appearance well-groomed.                Video-Visit Details    Type of service:  Video Visit    Video End Time:10:45 AM    Originating Location (pt. Location): Home    Distant Location (provider location):  [facility]     Platform used for Video Visit: [employer]

## 2022-05-14 ENCOUNTER — HEALTH MAINTENANCE LETTER (OUTPATIENT)
Age: 62
End: 2022-05-14

## 2022-09-03 ENCOUNTER — HEALTH MAINTENANCE LETTER (OUTPATIENT)
Age: 62
End: 2022-09-03

## 2023-06-03 ENCOUNTER — HEALTH MAINTENANCE LETTER (OUTPATIENT)
Age: 63
End: 2023-06-03

## 2024-07-06 ENCOUNTER — HEALTH MAINTENANCE LETTER (OUTPATIENT)
Age: 64
End: 2024-07-06